# Patient Record
Sex: MALE | Race: BLACK OR AFRICAN AMERICAN
[De-identification: names, ages, dates, MRNs, and addresses within clinical notes are randomized per-mention and may not be internally consistent; named-entity substitution may affect disease eponyms.]

---

## 2017-02-07 ENCOUNTER — HOSPITAL ENCOUNTER (EMERGENCY)
Dept: HOSPITAL 9 - MADERS | Age: 27
Discharge: HOME | End: 2017-02-07
Payer: COMMERCIAL

## 2017-02-07 DIAGNOSIS — L50.0: Primary | ICD-10-CM

## 2017-02-07 DIAGNOSIS — F17.210: ICD-10-CM

## 2017-02-07 PROCEDURE — 96372 THER/PROPH/DIAG INJ SC/IM: CPT

## 2017-02-07 NOTE — PICIS
[facility]

                                EMERGENCY RECORD



TRIAGE ( 15:18 SFRE)

TRIAGE NOTES:  ALLERGIC REACTION POSSIBLY TO EFFERVESCENT

      TAB HE TOOK WAS ALSO PLAYING AROUND [employer]. ( 15:18 SFRE)

PATIENT: NAME: Binu Hinojosa, AGE: 26, GENDER: male, :

      Fri Oct 09, 1990, TIME OF GREET:  15:12, PREFERRED

      LANGUAGE: English, ETHNICITY: Not  or , ECODE BILLING

      MAP: [facility], SSN: [id number], Zip Code: [address], [place]

      WEIGHT: 68.04, PHONE: [phone], MEDICAL RECORD NUMBER:

      [id number], ACCOUNT NUMBER: [id number], PERSON ID: [id number], PCP:

      NO PCP. ( 15:18 SFRE)

COMPLAINT:  ALLERGIC REACTION. ( 15:18 SFRE)

ADMISSION: URGENCY: 4 Non Urgent, ADMISSION SOURCE: Home,

      TRANSPORT: Walk-in, BED: ED -05. ( 15:18 SFRE)

ASSESSMENT: Assessment: NAD. RASH NOTED TO TORSO AND EXTREMETIES.

      (15:19 SFRE)

PAIN: Patient complains of pain described as,

      itching, on a scale 0-10 patient rates pain as 9,

      Location [place], [place]. (15:20 SFRE)

SIRS SCORING: Heart Rate  (0), Temp range 96.8-101.1 (0),

      respiratory rate 12-24 (0), Mental Status altered: no (0). (15:20

      SFRE)

IMMUNIZATIONS: Flu vaccine not up to date,

      Pneumococcal vaccine not up to date. (15:20 SFRE)

TRIAGE SCREENING: Patient denies suicidal ideation, Patient

      denies presence of domestic violence. (15:20 SFRE)

PROVIDERS: TRIAGE NURSE: Phyllis Gardner RN. (

      15:18 SFRE)

VITAL SIGNS: /79, Pulse 102, Resp 18, Temp 98.0,

      (Tympanic), Pain 9, (not applicable), O2 Sat 98, on Room Air, Time

      2017 15:16. (15:16 SFRE)

PREVIOUS VISIT ALLERGIES: No Known Drug Allergies. ( 15:18 SFRE)

  No Known Drug Allergies. (15:18 SFRE)



KNOWN ALLERGIES

ALLERGIES: (Unconfirmed)

LATEX ALLERGY? (Unconfirmed)

NKDA (Unconfirmed)

No Known Drug Allergies

No Known Drug Allergy (Unconfirmed)



CURRENT MEDICATIONS (15:18 SFRE)

None



VITAL SIGNS

VITAL SIGNS: BP: 136/79, Pulse: 102, Resp: 18, Temp: 98.0

      (Tympanic), Pain: 9 (not applicable), O2 sat: 98 on Room Air, Time:

      2017 15:16. (15:16 SFRE)

  Pulse: 98 (Regular), O2 sat: 98 on Room Air, Time: 2017 15:54. (15:54



&a-1R&a+25V*p+0X*y8647R*c202B*c15G*c2P*p-0X&a-25V&a+1R         Name: Binu Hinojosa  : 1990 M26 MedRec: [id number]

                             AcctNum: [id number]

  Prepared:  18:50 by Interface                 Page 1 of 6

                                      pMD

                        [facility]

                                EMERGENCY RECORD



      SFRE)

  BP: 121/79, Pulse: 91, Resp: 18, Temp: 98.0, Pain: 0, O2 sat: 98 on RA,

      Time: 2017 16:44. (16:44 SFRE)



NURSING ASSESSMENT: ALLERGIC REACTION (15:21 SFRE)

CONSTITUTIONAL: Patient arrives ambulatory, Gait steady, History

      obtained from patient, Patient appears comfortable, Patient

      cooperative, Patient alert, Oriented to person, place and time, Skin

      warm, Skin dry, Skin normal in color, Mucous membranes pink, Mucous

      membranes moist, Patient is well-groomed, Patient complains of RASH.

ALLERGIC REACTION: Notes: RASH NOTED TO TORSO AND EXT.

RESPIRATORY: Breath sounds clear, Respiratory assessment findings

      include respiratory effort easy, Respirations regular, Conversing

      normally, Neck and chest exam findings include trachea midline, Chest

      expansion equal, Chest movement symmetrical, no signs of distress, no

      associated cough noted.

SKIN: Skin assessment findings include skin warm, Skin dry, Skin

      normal in color, Inspection findings include rash,

      red, hives, itchy, without pustules,

      without drainage, to TOR[place], EXT.

SAFETY: Side rails up, Cart/Stretcher in lowest position, Call

      light within reach, Hospital ID band on.



NURSING PROCEDURE: DISCHARGE NOTE (16:44 SFRE)

DISCHARGE: Patient discharged to home, ambulating without

      assistance, friend driving, accompanied by friend, Summary of Care

      printed/ provided, Patient requested and was provided an electronic

      copy of Discharge Instructions, Discharge instructions given to

      patient, Simple or moderate discharge teaching performed, by

      VALERY LUKE, F/U WITH PCP. RX AS DIRECTED. RETURN TO ED AS

      NEEDED FOR NEW/CONCERNING OR WORSENING SYMPTOMS., Prescriptions

      given and instructions on side effects given, Name of prescription(s)

      given: PREDNISONE, Above person(s) verbalized understanding of

      discharge instructions and follow-up care, Notes: MAY ALSO TAKE

      OTC BENADRYL AND CLARITAN.

VITAL SIGNS: BP: 121, / 79, Pulse: 91, Resp: 18, Temp: 98.0,

      Pain: 0, O2 sat: 98, on: RA.



MEDICATION ADMINISTRATION SUMMARY



      Drug Name: Benadryl oral, Dose Ordered: 25 mg, Route: Oral, Status:

      Given, Time: 16:12 2017,

      Drug Name: predniSONE oral, Dose Ordered: 40 mg, Route: Oral, Status:

      Given, Time: 15:33 2017,

      Drug Name: EPINEPHrine injection, Dose Ordered: 0.3 mL, Route:

      Subcutaneous, Status: Given, Time: 15:29 2017,

      Drug Name: Benadryl oral, Dose Ordered: 25 mg, Route: Oral, Status:

      Given, Time: 15:28 2017, Detailed record available in Medication

      Service section.





&a-1R&a+25V*p+0X*d6343H*c202B*c15G*c2P*p-0X&a-25V&a+1R         Name: Binu Hinojosa  : 1990 M26 MedRec: [id number]

                             AcctNum: [id number]

  Prepared:  18:50 by Interface                 Page 2 of 6

                                      pMD

                        [facility]

                                EMERGENCY RECORD



MEDICATION SERVICE

Benadryl oral:  Order: Benadryl oral (diphenhydramine HCl) -

      Dose: 25 mg : Oral

      Ordered by: Kelsi Rivera MD

      Entered by: Kelsi Rivera MD  15:22 ,

      Acknowledged by: Phyllis Gardner RN  15:23

      Documented as given by: Phyllis Gardner RN  15:28

      Patient, Medication, Dose, Route and Time verified prior to

      administration.

       Amount given: 25MG, Site: Medication administered P.O., Correct

      patient, time, route, dose and medication confirmed prior to

      administration, Patient advised of actions and side-effects prior to

      administration, Allergies confirmed and medications reviewed prior to

      administration, Patient in position of comfort, Side rails up, Cart

      in lowest position, Family at bedside.

Benadryl oral:  Order: Benadryl oral (diphenhydramine HCl) -

      Dose: 25 mg : Oral

      Ordered by: Kelsi Rivera MD

      Entered by: Kelsi Rivera MD  16:00 ,

      Acknowledged by: Phyllis Gardner RN  16:06

      Documented as given by: Phyllis Gardner RN  16:12

      Patient, Medication, Dose, Route and Time verified prior to

      administration.

       Amount given: 25MG, Site: Medication administered S.L., Correct

      patient, time, route, dose and medication confirmed prior to

      administration, Patient advised of actions and side-effects prior to

      administration, Allergies confirmed and medications reviewed prior to

      administration, Patient in position of comfort, Side rails up, Cart

      in lowest position, Family at bedside.

EPINEPHrine injection:  Order: EPINEPHrine injection

      (epinephrine) - Dose: 0.3 mL : Subcutaneous

      Ordered by: Kelsi Rivera MD

      Entered by: Kelsi Rivera MD  15:22 ,

      Acknowledged by: Phyllis Gardner RN  15:23

      Documented as given by: Phyllis Gardner RN  15:29

      Patient, Medication, Dose, Route and Time verified prior to

      administration.

       Amount given: 0.3ML, Medication administered to right upper arm,

      Correct patient, time, route, dose and medication confirmed prior to

      administration, Patient advised of actions and side-effects prior to

      administration, Allergies confirmed and medications reviewed prior to

      administration, Advised not to ambulate without assistance, Patient

      in position of comfort, Side rails up, Cart in lowest position.

: Follow Up : No signs or symptoms of allergic reaction noted,

      Decreased symptoms, Response assessment

      performed. (15:55 SFRE)

predniSONE oral:  Order: predniSONE oral (prednisone) -

      Dose: 40 mg : Oral

      Ordered by: Kelsi Rivera MD

      Entered by: Kelsi Rivera MD  15:23 ,



&a-1R&a+25V*p+0X*e0966L*c202B*c15G*c2P*p-0X&a-25V&a+1R         Name: Binu Hinojosa  : 1990 M26 MedRec: [id number]

                             AcctNum: [id number]

  Prepared:  18:50 by Interface                 Page 3 of 6

                                      pMD

                        [facility]

                                EMERGENCY RECORD



      Acknowledged by: Phyllis Gardner RN  15:30

      Documented as given by: Phyllis Gardner RN  15:33

      Patient, Medication, Dose, Route and Time verified prior to

      administration.

       Amount given: 40MG, Site: Medication administered P.O., Correct

      patient, time, route, dose and medication confirmed prior to

      administration, Patient advised of actions and side-effects prior to

      administration, Allergies confirmed and medications reviewed prior to

      administration, Patient in position of comfort, Side rails up, Cart

      in lowest position, Family at bedside.



HPI ALLERGY

CHIEF COMPLAINT: Patient presents for evaluation of

      itching, Patient presents for evaluation of rash.

      (15:25 LHOD) HISTORIAN: History provided by patient. (15:25

      LHOD) LOCATION: Symptoms are generalized. (15:28

      LHOD) TIME COURSE:  PT REPORTS IN PAST HOUR HE HAD

      ONSET OF DIFFUSE ITCHY RASH. 1-2 HOURS BEFORE PT TOOK OTC

      EFFERVESCENT TAB FOR COLDS / ALLERGY FOR NASAL CONGESTION. IT

      CONTAINS ASA AND PHENYLEPHRINE, BUT PT HAS NO HX OF ALLERGY TO

      EITHER. REPORTS JUST BEFORE COMING HERE HE WAS PLAYING FOOTBALL IN

      [place] THAT SOMEONE SAID MIGHT HAVE POISON IVY. UNKNOWN ALLERGEN

      EXPOSURE. (15:25 LHOD) ASSOCIATED WITH: No associated

      abdominal pain, No associated anxiety, No associated cough, No

      associated chest pain, No associated fever, Associated with

      rash, No associated stridor, Associated with swelling,

      No associated vomiting, No associated wheezing. (15:28 LHOD)



ROS (15:29 LHOD)

CONSTITUTIONAL: Historian denies fever.

ENT: Historian denies stridor.

CARDIOVASCULAR: Historian denies chest pain.

RESPIRATORY: Historian denies cough, denies stridor, denies

      wheezing.

GI: Historian denies abdominal pain, denies vomiting.

SKIN: Historian reports rash.

ALLERGIC/IMMUNOLOGIC: Historian denies food allergies,

      reports hives.

PSYCHIATRIC: Historian denies anxiety.

NOTES: All systems reviewed, negative except as described above.



PAST MEDICAL HISTORY

MEDICAL HISTORY:  No past medical history. (15:18 SFRE)

MALE SURGICAL HISTORY:  Patient has no surgical

      history. (15:18 SFRE)

SOCIAL HISTORY:  Patient drinks socially, Patient denies

      drug use, Patient currently uses tobacco, Smokes cigars, daily,

      Patient denies alcohol use, Patient denies drug use, Patient

      currently uses tobacco, smokes cigarettes, Occasional or some day

      smoker. (15:18 SFRE)



&a-1R&a+25V*p+0X*e0597O*c202B*c15G*c2P*p-0X&a-25V&a+1R         Name: Binu Hinojosa  : 1990 M26 MedRec: [id number]

                             AcctNum: [id number]

  Prepared:  18:50 by Interface                 Page 4 of 6

                                      pMD

                        [facility]

                                EMERGENCY RECORD



NOTES: Nursing records reviewed, NO KNOWN ALLERGY. (15:43 LHOD)



PHYSICAL EXAM (15:29 LHOD)

CONSTITUTIONAL: Vital Signs Reviewed, Patient afebrile,

      Pulse, tachycardic, 102, Blood pressure

      normal, Respiratory rate normal, Patient appears non toxic, Patient

      alert and oriented to person, place and time.

EYES: Pupils equally round and reactive to light, Extraocular

      muscles intact.

ENT: Pharynx exam normal.

RESPIRATORY CHEST: Respiratory exam included findings of no

      respiratory distress, Breath sounds clear.

CARDIOVASCULAR: Cardiovascular exam included findings of heart

      rate regular rate and rhythm, Heart sounds normal.

ABDOMEN MALE: Abdominal exam included findings of abdomen

      nontender.

NEURO: Neuro exam findings include patient oriented to person,

      place and time, Speech normal.

SKIN: Rash present, hives, GENERALIZED

      TORSO AND EXTREMITIES.



EVENTS

TRANSFER:  Triage to Emergency Main ED -05. (

      15:18 SFRE)

   Removed from Emergency Main ED -05. (16:46 SFRE)



DOCTOR NOTES (16:04 LHOD)

TEXT:  1600--RASH LESS, BUT STILL DIFFUSE ERYTHEMATOUS

      RASH.



PROBLEM LIST

  No recorded problems



DIAGNOSIS (16:33 LHOD)

FINAL: PRIMARY: ALLERGIC REACTION TO UNKNOWN ALLERGEN WITH

      URTICARIA.



DISPOSITION

PATIENT:  Disposition Type: Discharge, Disposition: *Discharge

      Home, Condition: Good. (16:33 LHOD)

   Patient left the department. (16:46 SFRE)



INSTRUCTION (16:34 LHOD)

DISCHARGE:  URTICARIA.

FOLLOWUP: Follow up with Primary Care Physician as needed.

SPECIAL:  COOL COMPRESSES TO ITCHY AREAS.

      CONTINUE BENADRYL EVERY 4-6 HOURS AS NEEDED FOR ITCHING. MAY ALSO

      TAKE OVER THE COUNTER CLARITIN.

      *RETURN IF WORSE

      Follow-up with your PCP.



&a-1R&a+25V*p+0X*y7962Q*c202B*c15G*c2P*p-0X&a-25V&a+1R         Name: Binu Hinojosa  : 1990 M26 MedRec: [id number]

                             AcctNum: [id number]

  Prepared:  18:50 by Interface                 Page 5 of 6

                                      pMD

                        [facility]

                                EMERGENCY RECORD





PRESCRIPTION (16:33 LHOD)

predniSONE oral:  TABLET : 20 mg : ORAL : Quantity: *** 2 ***

      Unit: tab(s) Route: ORAL Schedule: once a day Dispense: *** 8 ***

      May substitute. Refills: *** No Refills ***.

NOTES:  No Refills.



IMAGING

WORK/SCHOOL RELEASE:  Image captured from scanner. (16:41 JPAR)

*DISCHARGE INSTRUCTIONS RECEIPT:  Image captured from scanner.

      (16:45 SFRE)

*SUPPLY CHARGE SHEET:  Image captured from scanner. (16:45 SFRE)



ADMIN

DIGITAL SIGNATURE:  VALERY Gardner, Phyllis. (16:45 SFRE)

   MD Miguel, Kelsi. (18:35 LHOD)

Key:

  ADITYA=PROSPER Lundy Julia LHOD=MD Miguel, Kelsi SFRE=VALERY Gardner, Phyllis



































































&a-1R&a+25V*p+0X*j1703X*c202B*c15G*c2P*p-0X&a-25V&a+1R         Name: Binu Hinojosa  : 1990 M26 MedRec: [id number]

                             AcctNum: [id number]

  Prepared: Tue 2017 18:50 by Interface                 Page 6 of 6

                                      pMD

MTDD

## 2017-02-07 NOTE — ERRECORD
[facility]

                                EMERGENCY RECORD



HPI ALLERGY

CHIEF COMPLAINT: Patient presents for evaluation of

      itching, Patient presents for evaluation of rash.

      (15:25 LHOD) HISTORIAN: History provided by patient. (15:25

      LHOD) LOCATION: Symptoms are generalized. (15:28

      LHOD) TIME COURSE:  PT REPORTS IN PAST HOUR HE HAD

      ONSET OF DIFFUSE ITCHY RASH. 1-2 HOURS BEFORE PT TOOK OTC

      EFFERVESCENT TAB FOR COLDS / ALLERGY FOR NASAL CONGESTION. IT

      CONTAINS ASA AND PHENYLEPHRINE, BUT PT HAS NO HX OF ALLERGY TO

      EITHER. REPORTS JUST BEFORE COMING HERE HE WAS PLAYING FOOTBALL [place]

      [place] THAT SOMEONE SAID MIGHT HAVE POISON IVY. UNKNOWN ALLERGEN

      EXPOSURE. (15:25 LHOD) ASSOCIATED WITH: No associated

      abdominal pain, No associated anxiety, No associated cough, No

      associated chest pain, No associated fever, Associated with

      rash, No associated stridor, Associated with swelling,

      No associated vomiting, No associated wheezing. (15:28 LHOD)



ROS (15:29 LHOD)

CONSTITUTIONAL: Historian denies fever.

ENT: Historian denies stridor.

CARDIOVASCULAR: Historian denies chest pain.

RESPIRATORY: Historian denies cough, denies stridor, denies

      wheezing.

GI: Historian denies abdominal pain, denies vomiting.

SKIN: Historian reports rash.

ALLERGIC/IMMUNOLOGIC: Historian denies food allergies,

      reports hives.

PSYCHIATRIC: Historian denies anxiety.

NOTES: All systems reviewed, negative except as described above.



PAST MEDICAL HISTORY

MEDICAL HISTORY:  No past medical history. (15:18 SFRE)

MALE SURGICAL HISTORY:  Patient has no surgical

      history. (15:18 SFRE)

SOCIAL HISTORY:  Patient drinks socially, Patient denies

      drug use, Patient currently uses tobacco, Smokes cigars, daily,

      Patient denies alcohol use, Patient denies drug use, Patient

      currently uses tobacco, smokes cigarettes, Occasional or some day

      smoker. (15:18 SFRE)

NOTES: Nursing records reviewed, NO KNOWN ALLERGY. (15:43 LHOD)



KNOWN ALLERGIES

ALLERGIES: (Unconfirmed)

LATEX ALLERGY? (Unconfirmed)

NKDA (Unconfirmed)

No Known Drug Allergies

No Known Drug Allergy (Unconfirmed)



CURRENT MEDICATIONS (15:18 SFRE)

None



&a-1R&a+25V*p+0X*q3325J*c202B*c15G*c2P*p-0X&a-25V&a+1R         Name: Binu Hinojosa  : 1990 M26 MedRec: [id number]

                             AcctNum: [id number]

  Prepared:  18:43 by Interface                 Page 1 of 3

                                      pMD

                        [facility]

                                EMERGENCY RECORD





VITAL SIGNS

VITAL SIGNS: BP: 136/79, Pulse: 102, Resp: 18, Temp: 98.0

      (Tympanic), Pain: 9 (not applicable), O2 sat: 98 on Room Air, Time:

      2017 15:16. (15:16 SFRE)

  Pulse: 98 (Regular), O2 sat: 98 on Room Air, Time: 2017 15:54. (15:54

      SFRE)

  BP: 121/79, Pulse: 91, Resp: 18, Temp: 98.0, Pain: 0, O2 sat: 98 on RA,

      Time: 2017 16:44. (16:44 SFRE)



PHYSICAL EXAM (15:29 LHOD)

CONSTITUTIONAL: Vital Signs Reviewed, Patient afebrile,

      Pulse, tachycardic, 102, Blood pressure

      normal, Respiratory rate normal, Patient appears non toxic, Patient

      alert and oriented to person, place and time.

EYES: Pupils equally round and reactive to light, Extraocular

      muscles intact.

ENT: Pharynx exam normal.

RESPIRATORY CHEST: Respiratory exam included findings of no

      respiratory distress, Breath sounds clear.

CARDIOVASCULAR: Cardiovascular exam included findings of heart

      rate regular rate and rhythm, Heart sounds normal.

ABDOMEN MALE: Abdominal exam included findings of abdomen

      nontender.

NEURO: Neuro exam findings include patient oriented to person,

      place and time, Speech normal.

SKIN: Rash present, hives, GENERALIZED

      TORSO AND EXTREMITIES.



MEDICATION ADMINISTRATION SUMMARY



      Drug Name: Benadryl oral, Dose Ordered: 25 mg, Route: Oral, Status:

      Given, Time: 16:12 2017,

      Drug Name: predniSONE oral, Dose Ordered: 40 mg, Route: Oral, Status:

      Given, Time: 15:33 2017,

      Drug Name: EPINEPHrine injection, Dose Ordered: 0.3 mL, Route:

      Subcutaneous, Status: Given, Time: 15:29 2017,

      Drug Name: Benadryl oral, Dose Ordered: 25 mg, Route: Oral, Status:

      Given, Time: 15:28 2017, Detailed record available in Medication

      Service section.



DOCTOR NOTES (16:04 LHOD)

TEXT:  1600--RASH LESS, BUT STILL DIFFUSE ERYTHEMATOUS

      RASH.



PROBLEM LIST

  No recorded problems



DIAGNOSIS (16:33 LHOD)

FINAL: PRIMARY: ALLERGIC REACTION TO UNKNOWN ALLERGEN WITH



&a-1R&a+25V*p+0X*p4641W*c202B*c15G*c2P*p-0X&a-25V&a+1R         Name: Binu Hinojosa  : 1990 M26 MedRec: [id number]

                             AcctNum: [id number]

  Prepared:  18:43 by Interface                 Page 2 of 3

                                      pMD

                        [facility]

                                EMERGENCY RECORD



      URTICARIA.



PRESCRIPTION (16:33 LHOD)

predniSONE oral:  TABLET : 20 mg : ORAL : Quantity: *** 2 ***

      Unit: tab(s) Route: ORAL Schedule: once a day Dispense: *** 8 ***

      May substitute. Refills: *** No Refills ***.

NOTES:  No Refills.



DISPOSITION

PATIENT:  Disposition Type: Discharge, Disposition: *Discharge

      Home, Condition: Good. (16:33 LHOD)

   Patient left the department. (16:46 SFRE)

Gustafson:

  LHOD=MD Miguel, Kelsi  SFRE=VALERY Gardner, Phyllis











































































&a-1R&a+25V*p+0X*b7698S*c202B*c15G*c2P*p-0X&a-25V&a+1R         Name: Binu Hinojosa  : 1990 M26 MedRec: [id number]

                             AcctNum: [id number]

  Prepared:  18:43 by Interface                 Page 3 of 3

                                      pMD

[facility]

## 2017-08-01 ENCOUNTER — HOSPITAL ENCOUNTER (EMERGENCY)
Dept: HOSPITAL 9 - MADERS | Age: 27
Discharge: HOME | End: 2017-08-01
Payer: SELF-PAY

## 2017-08-01 DIAGNOSIS — K02.9: Primary | ICD-10-CM

## 2017-08-01 DIAGNOSIS — F17.210: ICD-10-CM

## 2017-08-01 DIAGNOSIS — K04.7: ICD-10-CM

## 2017-08-01 PROCEDURE — 99282 EMERGENCY DEPT VISIT SF MDM: CPT

## 2018-02-06 ENCOUNTER — HOSPITAL ENCOUNTER (EMERGENCY)
Dept: HOSPITAL 9 - MADERS | Age: 28
Discharge: HOME | End: 2018-02-06
Payer: SELF-PAY

## 2018-02-06 DIAGNOSIS — F17.210: ICD-10-CM

## 2018-02-06 DIAGNOSIS — K30: Primary | ICD-10-CM

## 2018-02-06 LAB
ALBUMIN SERPL BCG-MCNC: 4.5 G/DL (ref 3.5–5)
ALP SERPL-CCNC: 59 U/L (ref 40–150)
ALT SERPL W P-5'-P-CCNC: 20 U/L (ref 8–55)
ANION GAP SERPL CALC-SCNC: 17 MMOL/L (ref 10–20)
AST SERPL-CCNC: 28 U/L (ref 5–34)
BILIRUB SERPL-MCNC: 0.7 MG/DL (ref 0.2–1.2)
BUN SERPL-MCNC: 15 MG/DL (ref 8.9–20.6)
CALCIUM SERPL-MCNC: 9.8 MG/DL (ref 7.8–10.44)
CHLORIDE SERPL-SCNC: 103 MMOL/L (ref 98–107)
CK MB SERPL-MCNC: 0.9 NG/ML (ref 0–6.6)
CO2 SERPL-SCNC: 25 MMOL/L (ref 22–29)
CREAT CL PREDICTED SERPL C-G-VRATE: 0 ML/MIN (ref 70–130)
GLOBULIN SER CALC-MCNC: 4.1 G/DL (ref 2.4–3.5)
GLUCOSE SERPL-MCNC: 92 MG/DL (ref 70–105)
HGB BLD-MCNC: 15.9 G/DL (ref 14–18)
MCH RBC QN AUTO: 27 PG (ref 27–31)
MCV RBC AUTO: 86.9 FL (ref 80–94)
MDIFF COMPLETE?: YES
PLATELET # BLD AUTO: 377 THOU/UL (ref 130–400)
POTASSIUM SERPL-SCNC: 4.3 MMOL/L (ref 3.5–5.1)
RBC # BLD AUTO: 5.9 MILL/UL (ref 4.7–6.1)
SODIUM SERPL-SCNC: 141 MMOL/L (ref 136–145)
TROPONIN I SERPL DL<=0.01 NG/ML-MCNC: (no result) NG/ML (ref ?–0.03)
WBC # BLD AUTO: 7.6 THOU/UL (ref 4.8–10.8)

## 2018-02-06 PROCEDURE — 83880 ASSAY OF NATRIURETIC PEPTIDE: CPT

## 2018-02-06 PROCEDURE — 36415 COLL VENOUS BLD VENIPUNCTURE: CPT

## 2018-02-06 PROCEDURE — 71045 X-RAY EXAM CHEST 1 VIEW: CPT

## 2018-02-06 PROCEDURE — 80053 COMPREHEN METABOLIC PANEL: CPT

## 2018-02-06 PROCEDURE — 84484 ASSAY OF TROPONIN QUANT: CPT

## 2018-02-06 PROCEDURE — 96374 THER/PROPH/DIAG INJ IV PUSH: CPT

## 2018-02-06 PROCEDURE — 85025 COMPLETE CBC W/AUTO DIFF WBC: CPT

## 2018-02-06 PROCEDURE — 93005 ELECTROCARDIOGRAM TRACING: CPT

## 2018-02-06 PROCEDURE — 82553 CREATINE MB FRACTION: CPT

## 2018-02-06 NOTE — RAD
CHEST ONE VIEW:

 

History: 

27-year-old male with chest pain. 

 

Comparison: 

1-17-15

 

FINDINGS: 

Heart size is normal. The lungs are clear. 

 

IMPRESSION: 

No acute intrathoracic disease. No evidence for pneumonia. 

 

POS: [facility]

## 2018-03-04 ENCOUNTER — HOSPITAL ENCOUNTER (EMERGENCY)
Dept: HOSPITAL 9 - MADERS | Age: 28
Discharge: HOME | End: 2018-03-04
Payer: SELF-PAY

## 2018-03-04 DIAGNOSIS — K02.9: ICD-10-CM

## 2018-03-04 DIAGNOSIS — F17.210: ICD-10-CM

## 2018-03-04 DIAGNOSIS — F16.10: Primary | ICD-10-CM

## 2018-03-04 LAB
DRUG SCREEN CUTOFF: (no result)
MEDTOX CONTROL LINE VALID?: (no result)

## 2018-03-04 PROCEDURE — 93005 ELECTROCARDIOGRAM TRACING: CPT

## 2018-03-04 PROCEDURE — 80306 DRUG TEST PRSMV INSTRMNT: CPT

## 2018-03-06 ENCOUNTER — HOSPITAL ENCOUNTER (EMERGENCY)
Dept: HOSPITAL 9 - MADERS | Age: 28
Discharge: HOME | End: 2018-03-06
Payer: SELF-PAY

## 2018-03-06 DIAGNOSIS — F17.210: ICD-10-CM

## 2018-03-06 DIAGNOSIS — K02.9: Primary | ICD-10-CM

## 2018-03-06 DIAGNOSIS — K03.81: ICD-10-CM

## 2018-03-06 PROCEDURE — 70360 X-RAY EXAM OF NECK: CPT

## 2018-03-06 NOTE — RAD
NECK SOFT TISSUE

3/6/18

 

HISTORY: 

Pain.

 

COMPARISON:  

None.

 

FINDINGS:  

No abnormal calcifications. No radiopaque foreign object. Soft tissues are unremarkable. 

 

IMPRESSION:  

1.      Unremarkable exam. 

2.      Enlargement of the palatine tonsils.

 

POS: [facility]

## 2018-05-21 ENCOUNTER — HOSPITAL ENCOUNTER (EMERGENCY)
Dept: HOSPITAL 9 - MADERS | Age: 28
Discharge: HOME | End: 2018-05-21
Payer: SELF-PAY

## 2018-05-21 DIAGNOSIS — F17.210: ICD-10-CM

## 2018-05-21 DIAGNOSIS — F41.9: Primary | ICD-10-CM

## 2018-05-21 LAB
ALBUMIN SERPL BCG-MCNC: 4.3 G/DL (ref 3.5–5)
ALP SERPL-CCNC: 59 U/L (ref 40–150)
ALT SERPL W P-5'-P-CCNC: 22 U/L (ref 8–55)
ANION GAP SERPL CALC-SCNC: 18 MMOL/L (ref 10–20)
AST SERPL-CCNC: 31 U/L (ref 5–34)
BILIRUB SERPL-MCNC: 0.6 MG/DL (ref 0.2–1.2)
BUN SERPL-MCNC: 17 MG/DL (ref 8.9–20.6)
CALCIUM SERPL-MCNC: 9.8 MG/DL (ref 7.8–10.44)
CHLORIDE SERPL-SCNC: 100 MMOL/L (ref 98–107)
CO2 SERPL-SCNC: 23 MMOL/L (ref 22–29)
CREAT CL PREDICTED SERPL C-G-VRATE: 0 ML/MIN (ref 70–130)
DRUG SCREEN CUTOFF: (no result)
GLOBULIN SER CALC-MCNC: 3.4 G/DL (ref 2.4–3.5)
GLUCOSE SERPL-MCNC: 91 MG/DL (ref 70–105)
HGB BLD-MCNC: 14.7 G/DL (ref 14–18)
MAGNESIUM SERPL-MCNC: 1.8 MG/DL (ref 1.6–2.6)
MCH RBC QN AUTO: 26.4 PG (ref 27–31)
MCV RBC AUTO: 83.4 FL (ref 80–94)
MDIFF COMPLETE?: YES
MEDTOX CONTROL LINE VALID?: (no result)
PLATELET # BLD AUTO: 291 THOU/UL (ref 130–400)
PLATELET BLD QL SMEAR: (no result)
POTASSIUM SERPL-SCNC: 4.1 MMOL/L (ref 3.5–5.1)
RBC # BLD AUTO: 5.57 MILL/UL (ref 4.7–6.1)
SODIUM SERPL-SCNC: 137 MMOL/L (ref 136–145)
WBC # BLD AUTO: 7.6 THOU/UL (ref 4.8–10.8)

## 2018-05-21 PROCEDURE — 36415 COLL VENOUS BLD VENIPUNCTURE: CPT

## 2018-05-21 PROCEDURE — 84443 ASSAY THYROID STIM HORMONE: CPT

## 2018-05-21 PROCEDURE — 80306 DRUG TEST PRSMV INSTRMNT: CPT

## 2018-05-21 PROCEDURE — 83735 ASSAY OF MAGNESIUM: CPT

## 2018-05-21 PROCEDURE — 85025 COMPLETE CBC W/AUTO DIFF WBC: CPT

## 2018-05-21 PROCEDURE — 80053 COMPREHEN METABOLIC PANEL: CPT

## 2018-05-21 PROCEDURE — 93005 ELECTROCARDIOGRAM TRACING: CPT

## 2018-06-01 ENCOUNTER — HOSPITAL ENCOUNTER (EMERGENCY)
Dept: HOSPITAL 9 - MADERS | Age: 28
Discharge: HOME | End: 2018-06-01
Payer: SELF-PAY

## 2018-06-01 DIAGNOSIS — T51.91XA: Primary | ICD-10-CM

## 2018-06-01 DIAGNOSIS — F17.210: ICD-10-CM

## 2018-06-01 LAB
ALBUMIN SERPL BCG-MCNC: 4.2 G/DL (ref 3.5–5)
ALP SERPL-CCNC: 56 U/L (ref 40–150)
ALT SERPL W P-5'-P-CCNC: 28 U/L (ref 8–55)
ANION GAP SERPL CALC-SCNC: 14 MMOL/L (ref 10–20)
APAP SERPL-MCNC: (no result) MCG/ML (ref 10–30)
AST SERPL-CCNC: 36 U/L (ref 5–34)
BACTERIA UR QL AUTO: (no result) HPF
BILIRUB SERPL-MCNC: 0.6 MG/DL (ref 0.2–1.2)
BUN SERPL-MCNC: 13 MG/DL (ref 8.9–20.6)
CALCIUM SERPL-MCNC: 9.4 MG/DL (ref 7.8–10.44)
CHLORIDE SERPL-SCNC: 99 MMOL/L (ref 98–107)
CO2 SERPL-SCNC: 28 MMOL/L (ref 22–29)
CREAT CL PREDICTED SERPL C-G-VRATE: 0 ML/MIN (ref 70–130)
DRUG SCREEN CUTOFF: (no result)
GLOBULIN SER CALC-MCNC: 3.3 G/DL (ref 2.4–3.5)
GLUCOSE SERPL-MCNC: 139 MG/DL (ref 70–105)
HGB BLD-MCNC: 14.4 G/DL (ref 14–18)
MCH RBC QN AUTO: 26.3 PG (ref 27–31)
MCV RBC AUTO: 82.9 FL (ref 80–94)
MDIFF COMPLETE?: YES
MEDTOX CONTROL LINE VALID?: (no result)
PLATELET # BLD AUTO: 258 THOU/UL (ref 130–400)
PLATELET BLD QL SMEAR: (no result)
POTASSIUM SERPL-SCNC: 3.8 MMOL/L (ref 3.5–5.1)
RBC # BLD AUTO: 5.46 MILL/UL (ref 4.7–6.1)
RBC UR QL AUTO: (no result) HPF (ref 0–3)
SALICYLATES SERPL-MCNC: (no result) MG/DL (ref 15–30)
SODIUM SERPL-SCNC: 137 MMOL/L (ref 136–145)
SP GR UR STRIP: 1.01 (ref 1–1.03)
WBC # BLD AUTO: 7.4 THOU/UL (ref 4.8–10.8)
WBC UR QL AUTO: (no result) HPF (ref 0–3)

## 2018-06-01 PROCEDURE — 80307 DRUG TEST PRSMV CHEM ANLYZR: CPT

## 2018-06-01 PROCEDURE — 96361 HYDRATE IV INFUSION ADD-ON: CPT

## 2018-06-01 PROCEDURE — 96374 THER/PROPH/DIAG INJ IV PUSH: CPT

## 2018-06-01 PROCEDURE — 80306 DRUG TEST PRSMV INSTRMNT: CPT

## 2018-06-01 PROCEDURE — 80053 COMPREHEN METABOLIC PANEL: CPT

## 2018-06-01 PROCEDURE — 85025 COMPLETE CBC W/AUTO DIFF WBC: CPT

## 2018-06-01 PROCEDURE — 87086 URINE CULTURE/COLONY COUNT: CPT

## 2018-06-01 PROCEDURE — 36415 COLL VENOUS BLD VENIPUNCTURE: CPT

## 2018-06-01 PROCEDURE — 81001 URINALYSIS AUTO W/SCOPE: CPT
